# Patient Record
Sex: MALE | Race: OTHER | NOT HISPANIC OR LATINO | URBAN - METROPOLITAN AREA
[De-identification: names, ages, dates, MRNs, and addresses within clinical notes are randomized per-mention and may not be internally consistent; named-entity substitution may affect disease eponyms.]

---

## 2018-05-04 ENCOUNTER — EMERGENCY (EMERGENCY)
Facility: HOSPITAL | Age: 53
LOS: 1 days | Discharge: HOME | End: 2018-05-04
Attending: EMERGENCY MEDICINE | Admitting: EMERGENCY MEDICINE
Payer: COMMERCIAL

## 2018-05-04 VITALS
WEIGHT: 164.91 LBS | OXYGEN SATURATION: 100 % | DIASTOLIC BLOOD PRESSURE: 72 MMHG | RESPIRATION RATE: 17 BRPM | SYSTOLIC BLOOD PRESSURE: 137 MMHG | TEMPERATURE: 98 F | HEIGHT: 65 IN | HEART RATE: 72 BPM

## 2018-05-04 PROCEDURE — 99283 EMERGENCY DEPT VISIT LOW MDM: CPT

## 2018-05-04 NOTE — ED ADULT TRIAGE NOTE - CHIEF COMPLAINT QUOTE
Pt arrives to ED c/o left sided neck pain for 1 week that has worsened to 10/10 today with no relief from OTC meds.  pt reports sometimes it radiates to his left side.  ekg in triage.

## 2018-05-05 VITALS
HEART RATE: 55 BPM | DIASTOLIC BLOOD PRESSURE: 55 MMHG | RESPIRATION RATE: 16 BRPM | SYSTOLIC BLOOD PRESSURE: 116 MMHG | OXYGEN SATURATION: 100 % | TEMPERATURE: 97 F

## 2018-05-05 RX ORDER — KETOROLAC TROMETHAMINE 30 MG/ML
30 SYRINGE (ML) INJECTION ONCE
Qty: 0 | Refills: 0 | Status: DISCONTINUED | OUTPATIENT
Start: 2018-05-05 | End: 2018-05-05

## 2018-05-05 RX ADMIN — Medication 30 MILLIGRAM(S): at 01:37

## 2018-05-05 NOTE — ED ADULT NURSE NOTE - OBJECTIVE STATEMENT
Patient A&Ox4 ambulated into room 26 c/o neck pain since 1 week ago, no fall, no trauma. Patient has full ROM of neck. No obvious deformities. Medicated as ordered, will continue to monitor. No vision changes, no headaches.

## 2018-05-05 NOTE — ED PROVIDER NOTE - OBJECTIVE STATEMENT
52 yr old male with hx of HTN presents with 2-3 days of left SCM tenderness.  Denies weakness, numbness of the upper arm.  Denies trauma to neck.  Denies midline back pain.  Denies dizziness, vertigo.
